# Patient Record
Sex: FEMALE | Race: ASIAN | NOT HISPANIC OR LATINO
[De-identification: names, ages, dates, MRNs, and addresses within clinical notes are randomized per-mention and may not be internally consistent; named-entity substitution may affect disease eponyms.]

---

## 2020-04-26 ENCOUNTER — MESSAGE (OUTPATIENT)
Age: 35
End: 2020-04-26

## 2020-05-02 LAB
SARS-COV-2 IGG SERPL IA-ACNC: <0.1 INDEX
SARS-COV-2 IGG SERPL QL IA: NEGATIVE

## 2021-01-06 ENCOUNTER — EMERGENCY (EMERGENCY)
Facility: HOSPITAL | Age: 36
LOS: 1 days | Discharge: HOME | End: 2021-01-06
Admitting: EMERGENCY MEDICINE
Payer: COMMERCIAL

## 2021-01-06 VITALS
HEART RATE: 68 BPM | RESPIRATION RATE: 16 BRPM | TEMPERATURE: 98 F | SYSTOLIC BLOOD PRESSURE: 128 MMHG | OXYGEN SATURATION: 99 % | DIASTOLIC BLOOD PRESSURE: 78 MMHG

## 2021-01-06 DIAGNOSIS — Z20.822 CONTACT WITH AND (SUSPECTED) EXPOSURE TO COVID-19: ICD-10-CM

## 2021-01-06 PROCEDURE — 99283 EMERGENCY DEPT VISIT LOW MDM: CPT

## 2021-01-06 NOTE — ED PROVIDER NOTE - OBJECTIVE STATEMENT
35 year old female with no PMhx presents for COVID swab. asymptomatic  Denies sore throat, cough, SOB, CP, palpitations, wheezing, abdominal pain, N/V/D/C, change in urinary/bowel function, dysuria, hematuria, flank pain, malaise, rash, HA, and dizziness.  No recent travel or sick contact noted.

## 2021-01-06 NOTE — ED PROVIDER NOTE - PATIENT PORTAL LINK FT
You can access the FollowMyHealth Patient Portal offered by [facility] by registering at the following website: [email or web address]. By joining [employer]’s FollowMyHealth portal, you will also be able to view your health information using other applications (apps) compatible with our system.

## 2021-01-06 NOTE — ED PROVIDER NOTE - NSFOLLOWUPINSTRUCTIONS_ED_ALL_ED_FT
IF YOU DO NOT GET YOUR RESULTS WITHIN 48 HOURS PLEASE CALL [phone].    IF YOUR RESULT COMES BACK POSITIVE:    1. STAY HOME for 14 DAYS  2. Minimize Human contact to ONLY ESSENTIAL  3. Every time you wash your hands, sing the HAPPY BIRTHDAY Song so you know you're washing long enough.  Make sure to scrub the webspace between your fingers.  4. DRINK 1-3 Liters of fluids day x at least 5 days.  To remain hydrated. Your fatigue, lightheadedness, and body aches will decrease and your fever has a better chance of breaking if you are well hydrated.    5. For your Fever and Body aches takes Tylenol 650-100mg every 4-6h (max 4000mg/day). Try not to use ibuprofen, aspirin or naproxen (Advil, Motrin or Aleve) as these may worsen Coronavirus infection.  6. RETURN TO THE ER IMMEDIATELY IF YOU HAVE WORSENING SHORTNESS OF BREATH. SYMPTOMS USUALLY PEAK BETWEEN DAY 7-10.

## 2021-05-24 PROBLEM — Z00.00 ENCOUNTER FOR PREVENTIVE HEALTH EXAMINATION: Status: ACTIVE | Noted: 2021-05-24

## 2024-02-15 PROBLEM — Z78.9 OTHER SPECIFIED HEALTH STATUS: Chronic | Status: ACTIVE | Noted: 2021-01-06

## 2024-02-26 ENCOUNTER — APPOINTMENT (OUTPATIENT)
Dept: PULMONOLOGY | Facility: CLINIC | Age: 39
End: 2024-02-26
Payer: COMMERCIAL

## 2024-02-26 VITALS
HEART RATE: 96 BPM | SYSTOLIC BLOOD PRESSURE: 131 MMHG | OXYGEN SATURATION: 100 % | HEIGHT: 63 IN | DIASTOLIC BLOOD PRESSURE: 77 MMHG | TEMPERATURE: 98 F | WEIGHT: 200 LBS | BODY MASS INDEX: 35.44 KG/M2

## 2024-02-26 DIAGNOSIS — Z87.891 PERSONAL HISTORY OF NICOTINE DEPENDENCE: ICD-10-CM

## 2024-02-26 DIAGNOSIS — Z87.09 PERSONAL HISTORY OF OTHER DISEASES OF THE RESPIRATORY SYSTEM: ICD-10-CM

## 2024-02-26 DIAGNOSIS — Z82.49 FAMILY HISTORY OF ISCHEMIC HEART DISEASE AND OTHER DISEASES OF THE CIRCULATORY SYSTEM: ICD-10-CM

## 2024-02-26 DIAGNOSIS — Z83.2 FAMILY HISTORY OF DISEASES OF THE BLOOD AND BLOOD-FORMING ORGANS AND CERTAIN DISORDERS INVOLVING THE IMMUNE MECHANISM: ICD-10-CM

## 2024-02-26 DIAGNOSIS — J45.20 MILD INTERMITTENT ASTHMA, UNCOMPLICATED: ICD-10-CM

## 2024-02-26 DIAGNOSIS — J45.30 MILD PERSISTENT ASTHMA, UNCOMPLICATED: ICD-10-CM

## 2024-02-26 PROCEDURE — 99204 OFFICE O/P NEW MOD 45 MIN: CPT

## 2024-02-26 RX ORDER — ALBUTEROL SULFATE 2.5 MG/.5ML
SOLUTION RESPIRATORY (INHALATION)
Refills: 0 | Status: ACTIVE | COMMUNITY

## 2024-02-26 RX ORDER — ALBUTEROL SULFATE 90 UG/1
108 (90 BASE) INHALANT RESPIRATORY (INHALATION)
Qty: 1 | Refills: 5 | Status: ACTIVE | COMMUNITY
Start: 2024-02-26 | End: 1900-01-01

## 2024-02-26 NOTE — HISTORY OF PRESENT ILLNESS
[Former] : former [Current] : current [Never] : never [TextBox_4] : The patient is a 38-year-old F, prior smoker, with PMHx of Asthma, here today to establish care for asthma and for medication refills. Previously followed by Dr. Zamorano who no longer takes her insurance. Diagnosed with asthma in her early 20s after URI. States had PFTs about 1 year ago. Has a hx of seasonal allergies and allergies to cats. Has been using Advair 100/50 BID x 1.5 years, uses albuterol every few weeks. Has never been hospitalized for asthma.  [TextBox_11] : 1 pp week [TextBox_13] : 5 [ESS] : 3 [TextBox_22] : occasional

## 2024-02-26 NOTE — CONSULT LETTER
[Dear  ___] : Dear  [unfilled], [Consult Letter:] : I had the pleasure of evaluating your patient, [unfilled]. [Please see my note below.] : Please see my note below. [Sincerely,] : Sincerely, [Consult Closing:] : Thank you very much for allowing me to participate in the care of this patient.  If you have any questions, please do not hesitate to contact me. [FreeTextEntry3] : Nicole Schilling MD  [employer] at [facility] Pulmonary, Critical Care, and Sleep Medicine

## 2024-02-26 NOTE — ASSESSMENT
[FreeTextEntry1] : The patient is a 38-year-old F, prior smoker, with PMHx of Asthma, here today to establish care for asthma. Asthma well-controlled on Advair. Will request breathing tests done last year with prior pulmonologist Dr. Zamorano. Advair and albuterol refilled today. Discussed trial of tapering Advair and using it PRN which she will try.   Follow-up yearly or sooner if needed.

## 2024-03-05 ENCOUNTER — NON-APPOINTMENT (OUTPATIENT)
Age: 39
End: 2024-03-05

## 2024-05-18 ENCOUNTER — RX RENEWAL (OUTPATIENT)
Age: 39
End: 2024-05-18

## 2024-05-18 RX ORDER — FLUTICASONE PROPIONATE AND SALMETEROL 100; 50 UG/1; UG/1
100-50 POWDER RESPIRATORY (INHALATION)
Qty: 180 | Refills: 0 | Status: ACTIVE | COMMUNITY
Start: 2024-02-26 | End: 1900-01-01

## 2024-10-28 ENCOUNTER — RX RENEWAL (OUTPATIENT)
Age: 39
End: 2024-10-28

## 2025-06-26 ENCOUNTER — EMERGENCY (EMERGENCY)
Age: 40
LOS: 1 days | End: 2025-06-26
Admitting: EMERGENCY MEDICINE
Payer: OTHER MISCELLANEOUS

## 2025-06-26 VITALS
DIASTOLIC BLOOD PRESSURE: 65 MMHG | SYSTOLIC BLOOD PRESSURE: 120 MMHG | WEIGHT: 190.04 LBS | OXYGEN SATURATION: 100 % | RESPIRATION RATE: 20 BRPM | TEMPERATURE: 99 F | HEIGHT: 63 IN | HEART RATE: 85 BPM

## 2025-06-26 DIAGNOSIS — S80.01XA CONTUSION OF RIGHT KNEE, INITIAL ENCOUNTER: ICD-10-CM

## 2025-06-26 DIAGNOSIS — J45.909 UNSPECIFIED ASTHMA, UNCOMPLICATED: ICD-10-CM
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
## 2025-06-26 DIAGNOSIS — M25.561 PAIN IN RIGHT KNEE: ICD-10-CM
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
## 2025-06-26 DIAGNOSIS — M25.562 PAIN IN LEFT KNEE: ICD-10-CM

## 2025-06-26 DIAGNOSIS — Z23 ENCOUNTER FOR IMMUNIZATION: ICD-10-CM

## 2025-06-26 PROCEDURE — 99053 MED SERV 10PM-8AM 24 HR FAC: CPT

## 2025-06-26 PROCEDURE — 99284 EMERGENCY DEPT VISIT MOD MDM: CPT

## 2025-06-26 RX ORDER — ACETAMINOPHEN 500 MG/5ML
650 LIQUID (ML) ORAL ONCE
Refills: 0 | Status: COMPLETED | OUTPATIENT
Start: 2025-06-26 | End: 2025-06-26

## 2025-06-26 RX ORDER — IBUPROFEN 200 MG
600 TABLET ORAL ONCE
Refills: 0 | Status: COMPLETED | OUTPATIENT
Start: 2025-06-26 | End: 2025-06-26

## 2025-06-26 RX ORDER — ALBUTEROL SULFATE 2.5 MG/3ML
1 VIAL, NEBULIZER (ML) INHALATION ONCE
Refills: 0 | Status: COMPLETED | OUTPATIENT
Start: 2025-06-26 | End: 2025-06-26